# Patient Record
Sex: MALE | URBAN - METROPOLITAN AREA
[De-identification: names, ages, dates, MRNs, and addresses within clinical notes are randomized per-mention and may not be internally consistent; named-entity substitution may affect disease eponyms.]

---

## 2024-02-16 ENCOUNTER — OV NP (OUTPATIENT)
Dept: URBAN - METROPOLITAN AREA CLINIC 107 | Facility: CLINIC | Age: 74
End: 2024-02-16

## 2024-02-16 NOTE — HPI-TODAY'S VISIT:
73-year-old male with a history of hypertension, coronary artery disease, CKD stage IV, hypercholesterolemia, thoracic aortic aneurysm status post stent is referred by Dr. Frantz Cardenas for screening colonoscopy.  A copy of today's visit will be forwarded to the referring provider. Labs 1/11/2024:Unremarkable lipid panel, hemoglobin A1c 8.8, glucose 228, BUN 40, creatinine 2.48, GFR 27, alk phos 157, AST 16, ALT 18, total bilirubin 0 point, unremarkable CBC.

## 2024-02-27 ENCOUNTER — OV NP (OUTPATIENT)
Dept: URBAN - METROPOLITAN AREA CLINIC 113 | Facility: CLINIC | Age: 74
End: 2024-02-27

## 2024-03-19 ENCOUNTER — OV NP (OUTPATIENT)
Dept: URBAN - METROPOLITAN AREA CLINIC 107 | Facility: CLINIC | Age: 74
End: 2024-03-19
Payer: COMMERCIAL

## 2024-03-19 VITALS
HEIGHT: 73 IN | HEART RATE: 89 BPM | DIASTOLIC BLOOD PRESSURE: 75 MMHG | WEIGHT: 222 LBS | SYSTOLIC BLOOD PRESSURE: 138 MMHG | BODY MASS INDEX: 29.42 KG/M2 | TEMPERATURE: 97.3 F

## 2024-03-19 DIAGNOSIS — K62.5 RECTAL BLEEDING: ICD-10-CM

## 2024-03-19 DIAGNOSIS — Z86.010 HISTORY OF COLON POLYPS: ICD-10-CM

## 2024-03-19 PROBLEM — 428283002: Status: ACTIVE | Noted: 2024-03-19

## 2024-03-19 PROCEDURE — 99204 OFFICE O/P NEW MOD 45 MIN: CPT | Performed by: STUDENT IN AN ORGANIZED HEALTH CARE EDUCATION/TRAINING PROGRAM

## 2024-03-19 RX ORDER — METOPROLOL SUCCINATE 25 MG/1
TABLET, EXTENDED RELEASE ORAL
Qty: 180 TABLET | Status: ACTIVE | COMMUNITY

## 2024-03-19 RX ORDER — ROSUVASTATIN CALCIUM 40 MG/1
TABLET, FILM COATED ORAL
Qty: 90 TABLET | Status: ACTIVE | COMMUNITY

## 2024-03-19 RX ORDER — TRAMADOL HYDROCHLORIDE AND ACETAMINOPHEN 37.5; 325 MG/1; MG/1
TAKE ONE TO TWO TABLETS BY MOUTH EVERY 6 TO 8 HOURS AS NEEDED TABLET ORAL
Qty: 40 UNSPECIFIED | Refills: 1 | Status: ACTIVE | COMMUNITY

## 2024-03-19 RX ORDER — CALCIUM CITRATE/VITAMIN D3 200MG-6.25
TABLET ORAL
Qty: 400 UNSPECIFIED | Status: ACTIVE | COMMUNITY

## 2024-03-19 RX ORDER — CELECOXIB 200 MG/1
TAKE TWO CAPSULES BY MOUTH ONE TIME DAILY FOR 7 DAYS THEN TAKE ONE CAPSULE BY MOUTH ONE TIME DAILY UNTIL GONE CAPSULE ORAL
Qty: 30 UNSPECIFIED | Refills: 0 | Status: ACTIVE | COMMUNITY

## 2024-03-19 RX ORDER — AMLODIPINE BESYLATE 5 MG/1
TAKE ONE TABLET BY MOUTH ONE TIME DAILY TABLET ORAL
Qty: 90 UNSPECIFIED | Refills: 1 | Status: ACTIVE | COMMUNITY

## 2024-03-19 RX ORDER — FLUTICASONE PROPIONATE 50 UG/1
SPRAY 2 SPRAYS IN EACH NOSTRIL ONCE DAILY SPRAY, METERED NASAL
Qty: 16 UNSPECIFIED | Refills: 0 | Status: ACTIVE | COMMUNITY

## 2024-03-19 RX ORDER — DORZOLAMIDE HYDROCHLORIDE AND TIMOLOL MALEATE 20; 5 MG/ML; MG/ML
SOLUTION/ DROPS OPHTHALMIC
Qty: 20 MILLILITER | Status: ACTIVE | COMMUNITY

## 2024-03-19 RX ORDER — NETARSUDIL AND LATANOPROST OPHTHALMIC SOLUTION, 0.02%/0.005% .2; .05 MG/ML; MG/ML
SOLUTION/ DROPS OPHTHALMIC; TOPICAL
Qty: 2.5 MILLILITER | Status: ACTIVE | COMMUNITY

## 2024-03-19 RX ORDER — SODIUM BICARBONATE 650 MG/1
TAKE ONE TABLET BY MOUTH TWICE A DAY TABLET ORAL
Qty: 180 UNSPECIFIED | Refills: 0 | Status: ACTIVE | COMMUNITY

## 2024-03-19 RX ORDER — CARVEDILOL 6.25 MG/1
TAKE ONE TABLET BY MOUTH TWICE A DAY TABLET, FILM COATED ORAL
Qty: 180 UNSPECIFIED | Refills: 0 | Status: ACTIVE | COMMUNITY

## 2024-03-19 RX ORDER — GABAPENTIN 100 MG/1
TAKE ONE CAPSULE BY MOUTH THREE TIMES A DAY AS NEEDED CAPSULE ORAL
Qty: 60 UNSPECIFIED | Refills: 0 | Status: ACTIVE | COMMUNITY

## 2024-03-19 RX ORDER — POLYETHYLENE GLYCOL 3350, SODIUM CHLORIDE, SODIUM BICARBONATE, POTASSIUM CHLORIDE 420; 11.2; 5.72; 1.48 G/4L; G/4L; G/4L; G/4L
AS DIRECTED POWDER, FOR SOLUTION ORAL AS DIRECTED
Qty: 8000 KIT | Refills: 0 | OUTPATIENT
Start: 2024-03-19 | End: 2024-03-21

## 2024-03-19 RX ORDER — INSULIN ASPART 100 [IU]/ML
INJECT 28 UNITS UNDER THE SKIN THREE TIMES A DAY BEFORE MEALS PLUS SLIDING SCALE (MAX DAILY UP TO 95 UNITS) INJECTION, SOLUTION INTRAVENOUS; SUBCUTANEOUS
Qty: 20 UNSPECIFIED | Refills: 2 | Status: ACTIVE | COMMUNITY

## 2024-03-19 RX ORDER — CYCLOBENZAPRINE HYDROCHLORIDE 5 MG/1
TAKE ONE TABLET BY MOUTH TWICE A DAY FOR TEN DAYS TABLET, FILM COATED ORAL
Qty: 20 UNSPECIFIED | Refills: 0 | Status: ACTIVE | COMMUNITY

## 2024-03-19 RX ORDER — AZELASTINE HYDROCHLORIDE 137 UG/1
SPRAY, METERED NASAL
Qty: 30 MILLILITER | Status: ACTIVE | COMMUNITY

## 2024-03-19 RX ORDER — AMLODIPINE BESYLATE 10 MG/1
TABLET ORAL
Qty: 90 TABLET | Status: ACTIVE | COMMUNITY

## 2024-03-19 RX ORDER — ONDANSETRON HYDROCHLORIDE 4 MG/1
TAKE ONE TABLET BY MOUTH EVERY 6 TO 8 HOURS AS NEEDED TABLET, FILM COATED ORAL
Qty: 30 UNSPECIFIED | Refills: 0 | Status: ACTIVE | COMMUNITY

## 2024-03-19 RX ORDER — TAMSULOSIN HYDROCHLORIDE 0.4 MG/1
TAKE ONE CAPSULE BY MOUTH ONE TIME DAILY CAPSULE ORAL
Qty: 30 UNSPECIFIED | Refills: 0 | Status: ACTIVE | COMMUNITY

## 2024-03-19 RX ORDER — INSULIN DETEMIR 100 [IU]/ML
INJECT 32 UNITS SUBCUTANEOUSLY TWICE DAILY INJECTION, SOLUTION SUBCUTANEOUS
Qty: 10 UNSPECIFIED | Refills: 2 | Status: ACTIVE | COMMUNITY

## 2024-03-19 RX ORDER — BRINZOLAMIDE/BRIMONIDINE TARTRATE 10; 2 MG/ML; MG/ML
SUSPENSION/ DROPS OPHTHALMIC
Qty: 8 MILLILITER | Status: ACTIVE | COMMUNITY

## 2024-03-19 RX ORDER — DEXAMETHASONE 4 MG/1
TAKE ONE TABLET BY MOUTH TWICE A DAY FOR 3 DAYS TABLET ORAL
Qty: 6 UNSPECIFIED | Refills: 0 | Status: ACTIVE | COMMUNITY

## 2024-03-19 NOTE — HPI-TODAY'S VISIT:
Initial visit 3/19/2024; PCP Dr. Frantz Ty Mr. Don Levy is a 73-year-old male presenting for colon cancer screening.  He has a past medical history significant for insulin-dependent diabetes mellitus, obesity, GERD, CKD stage IV, CAD status post CABG, hypertension, hyperlipidemia.Labs 1/11/2024: Hemoglobin A1c 8.8%, BUN 40, creatinine 2.48, total bilirubin 0.4, alkaline phosphate 157, AST 16, ALT 18, WBC 5.7, hemoglobin 13, platelet 225 Patient states he had a colonoscopy in October 2020 with several polyps removed and hemorrhoids banded.  Repeat colonoscopy in January 2021, this report is unavailable for review.  He was given a 3 year follow-up.  He presents for surveillance colonoscopy this time.  He states that his hemorrhoids were banded during his last colonoscopy, he continues to have intermittent rectal bleeding.

## 2024-04-05 ENCOUNTER — COLON (OUTPATIENT)
Dept: URBAN - METROPOLITAN AREA MEDICAL CENTER 19 | Facility: MEDICAL CENTER | Age: 74
End: 2024-04-05
Payer: COMMERCIAL

## 2024-04-05 ENCOUNTER — COLON (OUTPATIENT)
Dept: URBAN - METROPOLITAN AREA MEDICAL CENTER 19 | Facility: MEDICAL CENTER | Age: 74
End: 2024-04-05

## 2024-04-05 DIAGNOSIS — K62.5 ANAL BLEEDING: ICD-10-CM

## 2024-04-05 DIAGNOSIS — D12.4 ADENOMA OF DESCENDING COLON: ICD-10-CM

## 2024-04-05 PROCEDURE — 45385 COLONOSCOPY W/LESION REMOVAL: CPT | Performed by: STUDENT IN AN ORGANIZED HEALTH CARE EDUCATION/TRAINING PROGRAM

## 2024-04-05 RX ORDER — METOPROLOL SUCCINATE 25 MG/1
TABLET, EXTENDED RELEASE ORAL
Qty: 180 TABLET | Status: ACTIVE | COMMUNITY

## 2024-04-05 RX ORDER — CELECOXIB 200 MG/1
TAKE TWO CAPSULES BY MOUTH ONE TIME DAILY FOR 7 DAYS THEN TAKE ONE CAPSULE BY MOUTH ONE TIME DAILY UNTIL GONE CAPSULE ORAL
Qty: 30 UNSPECIFIED | Refills: 0 | Status: ACTIVE | COMMUNITY

## 2024-04-05 RX ORDER — AMLODIPINE BESYLATE 10 MG/1
TABLET ORAL
Qty: 90 TABLET | Status: ACTIVE | COMMUNITY

## 2024-04-05 RX ORDER — SODIUM BICARBONATE 650 MG/1
TAKE ONE TABLET BY MOUTH TWICE A DAY TABLET ORAL
Qty: 180 UNSPECIFIED | Refills: 0 | Status: ACTIVE | COMMUNITY

## 2024-04-05 RX ORDER — GABAPENTIN 100 MG/1
TAKE ONE CAPSULE BY MOUTH THREE TIMES A DAY AS NEEDED CAPSULE ORAL
Qty: 60 UNSPECIFIED | Refills: 0 | Status: ACTIVE | COMMUNITY

## 2024-04-05 RX ORDER — DORZOLAMIDE HYDROCHLORIDE AND TIMOLOL MALEATE 20; 5 MG/ML; MG/ML
SOLUTION/ DROPS OPHTHALMIC
Qty: 20 MILLILITER | Status: ACTIVE | COMMUNITY

## 2024-04-05 RX ORDER — TAMSULOSIN HYDROCHLORIDE 0.4 MG/1
TAKE ONE CAPSULE BY MOUTH ONE TIME DAILY CAPSULE ORAL
Qty: 30 UNSPECIFIED | Refills: 0 | Status: ACTIVE | COMMUNITY

## 2024-04-05 RX ORDER — INSULIN DETEMIR 100 [IU]/ML
INJECT 32 UNITS SUBCUTANEOUSLY TWICE DAILY INJECTION, SOLUTION SUBCUTANEOUS
Qty: 10 UNSPECIFIED | Refills: 2 | Status: ACTIVE | COMMUNITY

## 2024-04-05 RX ORDER — ROSUVASTATIN CALCIUM 40 MG/1
TABLET, FILM COATED ORAL
Qty: 90 TABLET | Status: ACTIVE | COMMUNITY

## 2024-04-05 RX ORDER — INSULIN ASPART 100 [IU]/ML
INJECT 28 UNITS UNDER THE SKIN THREE TIMES A DAY BEFORE MEALS PLUS SLIDING SCALE (MAX DAILY UP TO 95 UNITS) INJECTION, SOLUTION INTRAVENOUS; SUBCUTANEOUS
Qty: 20 UNSPECIFIED | Refills: 2 | Status: ACTIVE | COMMUNITY

## 2024-04-05 RX ORDER — CYCLOBENZAPRINE HYDROCHLORIDE 5 MG/1
TAKE ONE TABLET BY MOUTH TWICE A DAY FOR TEN DAYS TABLET, FILM COATED ORAL
Qty: 20 UNSPECIFIED | Refills: 0 | Status: ACTIVE | COMMUNITY

## 2024-04-05 RX ORDER — CARVEDILOL 6.25 MG/1
TAKE ONE TABLET BY MOUTH TWICE A DAY TABLET, FILM COATED ORAL
Qty: 180 UNSPECIFIED | Refills: 0 | Status: ACTIVE | COMMUNITY

## 2024-04-05 RX ORDER — TRAMADOL HYDROCHLORIDE AND ACETAMINOPHEN 37.5; 325 MG/1; MG/1
TAKE ONE TO TWO TABLETS BY MOUTH EVERY 6 TO 8 HOURS AS NEEDED TABLET ORAL
Qty: 40 UNSPECIFIED | Refills: 1 | Status: ACTIVE | COMMUNITY

## 2024-04-05 RX ORDER — FLUTICASONE PROPIONATE 50 UG/1
SPRAY 2 SPRAYS IN EACH NOSTRIL ONCE DAILY SPRAY, METERED NASAL
Qty: 16 UNSPECIFIED | Refills: 0 | Status: ACTIVE | COMMUNITY

## 2024-04-05 RX ORDER — ONDANSETRON HYDROCHLORIDE 4 MG/1
TAKE ONE TABLET BY MOUTH EVERY 6 TO 8 HOURS AS NEEDED TABLET, FILM COATED ORAL
Qty: 30 UNSPECIFIED | Refills: 0 | Status: ACTIVE | COMMUNITY

## 2024-04-05 RX ORDER — DEXAMETHASONE 4 MG/1
TAKE ONE TABLET BY MOUTH TWICE A DAY FOR 3 DAYS TABLET ORAL
Qty: 6 UNSPECIFIED | Refills: 0 | Status: ACTIVE | COMMUNITY

## 2024-04-05 RX ORDER — AMLODIPINE BESYLATE 5 MG/1
TAKE ONE TABLET BY MOUTH ONE TIME DAILY TABLET ORAL
Qty: 90 UNSPECIFIED | Refills: 1 | Status: ACTIVE | COMMUNITY

## 2024-04-05 RX ORDER — BRINZOLAMIDE/BRIMONIDINE TARTRATE 10; 2 MG/ML; MG/ML
SUSPENSION/ DROPS OPHTHALMIC
Qty: 8 MILLILITER | Status: ACTIVE | COMMUNITY

## 2024-04-05 RX ORDER — CALCIUM CITRATE/VITAMIN D3 200MG-6.25
TABLET ORAL
Qty: 400 UNSPECIFIED | Status: ACTIVE | COMMUNITY

## 2024-04-05 RX ORDER — AZELASTINE HYDROCHLORIDE 137 UG/1
SPRAY, METERED NASAL
Qty: 30 MILLILITER | Status: ACTIVE | COMMUNITY

## 2024-04-05 RX ORDER — NETARSUDIL AND LATANOPROST OPHTHALMIC SOLUTION, 0.02%/0.005% .2; .05 MG/ML; MG/ML
SOLUTION/ DROPS OPHTHALMIC; TOPICAL
Qty: 2.5 MILLILITER | Status: ACTIVE | COMMUNITY